# Patient Record
(demographics unavailable — no encounter records)

---

## 2018-01-09 DIAGNOSIS — E87.6 HYPOKALEMIA: ICD-10-CM

## 2018-01-09 RX ORDER — POTASSIUM CHLORIDE 750 MG/1
TABLET, EXTENDED RELEASE ORAL
Qty: 90 TABLET | Refills: 3 | Status: SHIPPED | OUTPATIENT
Start: 2018-01-09

## 2018-01-09 NOTE — TELEPHONE ENCOUNTER
Last visit- 1/18/16  Next visit- Visit date not found    Requested Prescriptions     Pending Prescriptions Disp Refills    potassium chloride (KLOR-CON M) 10 MEQ extended release tablet [Pharmacy Med Name: POT CHLOR ER (DISP) TABS 10MEQ] 90 tablet 3     Sig: TAKE 1 TABLET DAILY

## 2018-10-12 DIAGNOSIS — I10 ESSENTIAL HYPERTENSION: ICD-10-CM

## 2018-10-12 RX ORDER — LISINOPRIL 5 MG/1
5 TABLET ORAL DAILY
Qty: 90 TABLET | Refills: 3 | Status: SHIPPED | OUTPATIENT
Start: 2018-10-12

## 2018-10-12 RX ORDER — HYDROCHLOROTHIAZIDE 25 MG/1
TABLET ORAL
Qty: 45 TABLET | Refills: 3 | Status: SHIPPED | OUTPATIENT
Start: 2018-10-12

## 2021-12-05 LAB — MAMMOGRAPHY, EXTERNAL: NORMAL
